# Patient Record
Sex: FEMALE | Race: BLACK OR AFRICAN AMERICAN | NOT HISPANIC OR LATINO | Employment: FULL TIME | URBAN - METROPOLITAN AREA
[De-identification: names, ages, dates, MRNs, and addresses within clinical notes are randomized per-mention and may not be internally consistent; named-entity substitution may affect disease eponyms.]

---

## 2019-04-30 ENCOUNTER — OFFICE VISIT (OUTPATIENT)
Dept: OBSTETRICS AND GYNECOLOGY | Facility: CLINIC | Age: 34
End: 2019-04-30
Payer: MEDICAID

## 2019-04-30 VITALS
BODY MASS INDEX: 33.26 KG/M2 | SYSTOLIC BLOOD PRESSURE: 118 MMHG | WEIGHT: 194.81 LBS | HEIGHT: 64 IN | DIASTOLIC BLOOD PRESSURE: 70 MMHG

## 2019-04-30 DIAGNOSIS — Z11.3 SCREENING EXAMINATION FOR STD (SEXUALLY TRANSMITTED DISEASE): ICD-10-CM

## 2019-04-30 DIAGNOSIS — N93.9 ABNORMAL UTERINE BLEEDING (AUB): Primary | ICD-10-CM

## 2019-04-30 PROCEDURE — 99203 OFFICE O/P NEW LOW 30 MIN: CPT | Mod: S$PBB,,, | Performed by: OBSTETRICS & GYNECOLOGY

## 2019-04-30 PROCEDURE — 99203 PR OFFICE/OUTPT VISIT, NEW, LEVL III, 30-44 MIN: ICD-10-PCS | Mod: S$PBB,,, | Performed by: OBSTETRICS & GYNECOLOGY

## 2019-04-30 PROCEDURE — 99203 OFFICE O/P NEW LOW 30 MIN: CPT | Mod: PBBFAC,PN | Performed by: OBSTETRICS & GYNECOLOGY

## 2019-04-30 PROCEDURE — 87510 GARDNER VAG DNA DIR PROBE: CPT

## 2019-04-30 PROCEDURE — 87480 CANDIDA DNA DIR PROBE: CPT

## 2019-04-30 PROCEDURE — 99999 PR PBB SHADOW E&M-NEW PATIENT-LVL III: ICD-10-PCS | Mod: PBBFAC,,, | Performed by: OBSTETRICS & GYNECOLOGY

## 2019-04-30 PROCEDURE — 99999 PR PBB SHADOW E&M-NEW PATIENT-LVL III: CPT | Mod: PBBFAC,,, | Performed by: OBSTETRICS & GYNECOLOGY

## 2019-04-30 PROCEDURE — 87491 CHLMYD TRACH DNA AMP PROBE: CPT

## 2019-04-30 RX ORDER — IBUPROFEN 800 MG/1
800 TABLET ORAL
COMMUNITY

## 2019-04-30 RX ORDER — VALACYCLOVIR HYDROCHLORIDE 500 MG/1
TABLET, FILM COATED ORAL
COMMUNITY

## 2019-04-30 NOTE — PROGRESS NOTES
Chief Complaint   Patient presents with    painful cycles       HISTORY OF PRESENT ILLNESS:   Cheri Burleson is a 33 y.o. female   (1  and 3 )who presents for AUB.  Patient's last menstrual period was 2019..  She has complains of irregular and  Heavy cycles with significant pain worsening over the past 6 years. She had a BTL after her last delivery and see feels like that is making it worse. Her cycles have gone from 5 days to 7 days and she is changing a super pad and tampon every 2 hours. She had an US with [facility] on Tuesday. Had annual 2018 with pap smear.      No past medical history on file.     No past surgical history on file.      Social History     Socioeconomic History    Marital status:      Spouse name: Not on file    Number of children: Not on file    Years of education: Not on file    Highest education level: Not on file   Occupational History    Not on file   Social Needs    Financial resource strain: Not on file    Food insecurity:     Worry: Not on file     Inability: Not on file    Transportation needs:     Medical: Not on file     Non-medical: Not on file   Tobacco Use    Smoking status: Not on file   Substance and Sexual Activity    Alcohol use: Not on file    Drug use: Not on file    Sexual activity: Not on file   Lifestyle    Physical activity:     Days per week: Not on file     Minutes per session: Not on file    Stress: Not on file   Relationships    Social connections:     Talks on phone: Not on file     Gets together: Not on file     Attends Caodaism service: Not on file     Active member of club or organization: Not on file     Attends meetings of clubs or organizations: Not on file     Relationship status: Not on file   Other Topics Concern    Not on file   Social History Narrative    Not on file       No family history on file.      OB History   No data available      for herpes outbreak     COMPREHENSIVE GYN HISTORY:  PAP  "History: had abnormal Pap with colpo 16 years ago but has been normal since.   Infection History: h/o herpes, no outbreaks in 2 years; h/o CT treated a few months ago, Denies PID.  Benign History:Denies uterine fibroids. Denies ovarian cysts. Denies endometriosis Denies other conditions.  Cancer History: Denies cervical cancer. Denies uterine cancer or hyperplasia. Denies ovarian cancer. Denies vulvar cancer or pre-cancer. Denies vaginal cancer or pre-cancer. Denies breast cancer. Denies colon cancer.  Cycle: 16/Q17d/7d, heavy with cramps  Contraception: bTL     ROS:  GENERAL: Denies weight gain or weight loss. Feeling well overall.   SKIN: Denies rash or lesions.   HEAD: Denies headache.   NODES: Denies enlarged lymph nodes.   CHEST: Denies shortness of breath.   ABDOMEN: No abdominal pain, constipation, diarrhea, nausea, vomiting or rectal bleeding.   URINARY: No frequency, dysuria, hematuria, or burning on urination.  REPRODUCTIVE: See HPI.   BREASTS: The patient denies pain, lumps, or nipple discharge.       Ht 5' 4" (1.626 m)   Wt 88.4 kg (194 lb 12.8 oz)   LMP 04/27/2019   BMI 33.44 kg/m²     APPEARANCE: Well nourished, well developed, in no acute distress.  NECK: Neck symmetric without  thyromegaly.  NODES: No inguinal, cervical lymph node enlargement.  CHEST: Lungs clear to auscultation.  HEART: Regular rate and rhythm, no murmurs, rubs or gallops.  ABDOMEN: Soft. No tenderness or masses. No hernias. No hepatosplenomegaly.    PELVIC:   VULVA: No lesions. Normal female genitalia.  URETHRAL MEATUS: Normal size and location, no lesions, no prolapse.  URETHRA: No masses, tenderness, prolapse or scarring.  VAGINA: Moist and well rugated, no discharge, no significant cystocele or rectocele.  CERVIX: No lesions and discharge. Nabothian cyst at 1 oclock   UTERUS: Normal size, regular shape, mobile, non-tender, bladder base nontender.  ADNEXA: No masses or tenderness.        1. Abnormal uterine bleeding (AUB)  "       Plan:  Will get TSH and CBC and STD screening. Will request pap smear records. Will request US records. Will see back in 2 weeks to go over results. Briefly discussed options of medications vs surgery. hesitant to consider hysterectomy.       F/u in 1 yr or PRN

## 2019-05-01 LAB
BACTERIAL VAGINOSIS DNA: POSITIVE
CANDIDA GLABRATA DNA: NEGATIVE
CANDIDA KRUSEI DNA: NEGATIVE
CANDIDA RRNA VAG QL PROBE: NEGATIVE
T VAGINALIS RRNA GENITAL QL PROBE: NEGATIVE

## 2019-05-02 ENCOUNTER — TELEPHONE (OUTPATIENT)
Dept: OBSTETRICS AND GYNECOLOGY | Facility: CLINIC | Age: 34
End: 2019-05-02

## 2019-05-02 LAB
C TRACH DNA SPEC QL NAA+PROBE: NOT DETECTED
N GONORRHOEA DNA SPEC QL NAA+PROBE: NOT DETECTED

## 2019-05-02 RX ORDER — METRONIDAZOLE 500 MG/1
500 TABLET ORAL EVERY 12 HOURS
Qty: 14 TABLET | Refills: 0 | Status: SHIPPED | OUTPATIENT
Start: 2019-05-02 | End: 2019-05-09

## 2019-05-02 NOTE — TELEPHONE ENCOUNTER
Please let patient know she tested + for a BV infection. This is not an STD but is a change in the normal bacterial denise in the vagina that can cause a discharge and an odor. I sent flagyl in to the pharmacy for her to take twice a day for 7 days. Please don't drink while taking the medication. It may give you a bad taste in your mouth or nausea, this is not an allergic reaction just a side effect of the medication. If it is intolerable we can call in a vaginal gel which can treat it but it can be more expensive depending on your insurance. I am still waiting on her other results. Just let us know.

## 2019-05-07 ENCOUNTER — TELEPHONE (OUTPATIENT)
Dept: OBSTETRICS AND GYNECOLOGY | Facility: CLINIC | Age: 34
End: 2019-05-07

## 2019-05-14 ENCOUNTER — OFFICE VISIT (OUTPATIENT)
Dept: OBSTETRICS AND GYNECOLOGY | Facility: CLINIC | Age: 34
End: 2019-05-14
Payer: MEDICAID

## 2019-05-14 VITALS
SYSTOLIC BLOOD PRESSURE: 118 MMHG | WEIGHT: 193.63 LBS | BODY MASS INDEX: 33.06 KG/M2 | HEIGHT: 64 IN | DIASTOLIC BLOOD PRESSURE: 64 MMHG

## 2019-05-14 DIAGNOSIS — N93.9 ABNORMAL UTERINE BLEEDING (AUB): Primary | ICD-10-CM

## 2019-05-14 PROCEDURE — 99999 PR PBB SHADOW E&M-EST. PATIENT-LVL III: ICD-10-PCS | Mod: PBBFAC,,, | Performed by: OBSTETRICS & GYNECOLOGY

## 2019-05-14 PROCEDURE — 99213 OFFICE O/P EST LOW 20 MIN: CPT | Mod: PBBFAC,PN | Performed by: OBSTETRICS & GYNECOLOGY

## 2019-05-14 PROCEDURE — 99212 PR OFFICE/OUTPT VISIT, EST, LEVL II, 10-19 MIN: ICD-10-PCS | Mod: S$PBB,,, | Performed by: OBSTETRICS & GYNECOLOGY

## 2019-05-14 PROCEDURE — 99212 OFFICE O/P EST SF 10 MIN: CPT | Mod: S$PBB,,, | Performed by: OBSTETRICS & GYNECOLOGY

## 2019-05-14 PROCEDURE — 99999 PR PBB SHADOW E&M-EST. PATIENT-LVL III: CPT | Mod: PBBFAC,,, | Performed by: OBSTETRICS & GYNECOLOGY

## 2019-05-14 RX ORDER — METRONIDAZOLE 500 MG/1
500 TABLET ORAL EVERY 12 HOURS
Qty: 14 TABLET | Refills: 0 | Status: SHIPPED | OUTPATIENT
Start: 2019-05-14 | End: 2019-05-21

## 2019-05-14 NOTE — PROGRESS NOTES
Chief Complaint   Patient presents with    Follow-up       HISTORY OF PRESENT ILLNESS:   Cheri Burleson is a 33 y.o. female   (1  and 3 )who presents for AUB.  Patient's last menstrual period was 2019..  She has complains of irregular and  Heavy cycles with significant pain worsening over the past 6 years. She had a BTL after her last delivery and see feels like that is making it worse. Her cycles have gone from 5 days to 7 days and she is changing a super pad and tampon every 2 hours. She had an US with [facility] on Tuesday. Had annual 2018 with pap smear.      History reviewed. No pertinent past medical history.       Past Surgical History:   Procedure Laterality Date     SECTION           Social History     Socioeconomic History    Marital status:      Spouse name: Not on file    Number of children: Not on file    Years of education: Not on file    Highest education level: Not on file   Occupational History    Not on file   Social Needs    Financial resource strain: Not on file    Food insecurity:     Worry: Not on file     Inability: Not on file    Transportation needs:     Medical: Not on file     Non-medical: Not on file   Tobacco Use    Smoking status: Current Every Day Smoker   Substance and Sexual Activity    Alcohol use: Yes     Comment: occasionally    Drug use: Never    Sexual activity: Yes     Partners: Male     Birth control/protection: None   Lifestyle    Physical activity:     Days per week: Not on file     Minutes per session: Not on file    Stress: Not on file   Relationships    Social connections:     Talks on phone: Not on file     Gets together: Not on file     Attends Confucianist service: Not on file     Active member of club or organization: Not on file     Attends meetings of clubs or organizations: Not on file     Relationship status: Not on file   Other Topics Concern    Not on file   Social History Narrative    Not on file  "      Family History   Problem Relation Age of Onset    Hypertension Maternal Grandmother     Glaucoma Maternal Grandmother     Aneurysm Mother     Glaucoma Mother          OB History    Para Term  AB Living   4 4       4   SAB TAB Ectopic Multiple Live Births                  # Outcome Date GA Lbr Sha/2nd Weight Sex Delivery Anes PTL Lv   4 Para      CS-Unspec      3 Para      Vag-Spont      2 Para      Vag-Spont      1 Para      Vag-Spont         for herpes outbreak     COMPREHENSIVE GYN HISTORY:  PAP History: had abnormal Pap with colpo 16 years ago but has been normal since.   Infection History: h/o herpes, no outbreaks in 2 years; h/o CT treated a few months ago, Denies PID.  Benign History:Denies uterine fibroids. Denies ovarian cysts. Denies endometriosis Denies other conditions.  Cancer History: Denies cervical cancer. Denies uterine cancer or hyperplasia. Denies ovarian cancer. Denies vulvar cancer or pre-cancer. Denies vaginal cancer or pre-cancer. Denies breast cancer. Denies colon cancer.  Cycle: 16/Q17d/7d, heavy with cramps  Contraception: bTL     ROS:  GENERAL: Denies weight gain or weight loss. Feeling well overall.   SKIN: Denies rash or lesions.   HEAD: Denies headache.   NODES: Denies enlarged lymph nodes.   CHEST: Denies shortness of breath.   ABDOMEN: No abdominal pain, constipation, diarrhea, nausea, vomiting or rectal bleeding.   URINARY: No frequency, dysuria, hematuria, or burning on urination.  REPRODUCTIVE: See HPI.   BREASTS: The patient denies pain, lumps, or nipple discharge.       /64   Ht 5' 4" (1.626 m)   Wt 87.8 kg (193 lb 9.6 oz)   LMP 2019   BMI 33.23 kg/m²     APPEARANCE: Well nourished, well developed, in no acute distress.  NECK: Neck symmetric without  thyromegaly.  NODES: No inguinal, cervical lymph node enlargement.  CHEST: Lungs clear to auscultation.  HEART: Regular rate and rhythm, no murmurs, rubs or gallops.  ABDOMEN: Soft. No " tenderness or masses. No hernias. No hepatosplenomegaly.    PELVIC:   VULVA: No lesions. Normal female genitalia.  URETHRAL MEATUS: Normal size and location, no lesions, no prolapse.  URETHRA: No masses, tenderness, prolapse or scarring.  VAGINA: Moist and well rugated, no discharge, no significant cystocele or rectocele.  CERVIX: No lesions and discharge. Nabothian cyst at 1 oclock   UTERUS: Normal size, regular shape, mobile, non-tender, bladder base nontender.  ADNEXA: No masses or tenderness.    [facility] US: uterus: 10.2x6x7.2 cm normal uterus with no masses, bilateral ovaries normal with 4mm follicle on right and 6mm follicle on left     No diagnosis found.    Plan:  Discussed options, no combined ocps since smokes, and she would like to do depo-provera again as she had tolerated that well in the past. Will make appt for depo-provera. If not controlled may consider EMB then ablation since doesn't want to miss work.       F/u in 1 yr or PRN

## 2019-05-15 ENCOUNTER — OFFICE VISIT (OUTPATIENT)
Dept: OBSTETRICS AND GYNECOLOGY | Facility: CLINIC | Age: 34
End: 2019-05-15
Payer: MEDICAID

## 2019-05-15 DIAGNOSIS — Z30.42 ENCOUNTER FOR SURVEILLANCE OF INJECTABLE CONTRACEPTIVE: Primary | ICD-10-CM

## 2019-05-15 PROCEDURE — 99999 PR PBB SHADOW E&M-EST. PATIENT-LVL I: ICD-10-PCS | Mod: PBBFAC,,, | Performed by: OBSTETRICS & GYNECOLOGY

## 2019-05-15 PROCEDURE — 99499 UNLISTED E&M SERVICE: CPT | Mod: S$PBB,,, | Performed by: OBSTETRICS & GYNECOLOGY

## 2019-05-15 PROCEDURE — 99999 PR PBB SHADOW E&M-EST. PATIENT-LVL I: CPT | Mod: PBBFAC,,, | Performed by: OBSTETRICS & GYNECOLOGY

## 2019-05-15 PROCEDURE — 99211 OFF/OP EST MAY X REQ PHY/QHP: CPT | Mod: PBBFAC,PN,25 | Performed by: OBSTETRICS & GYNECOLOGY

## 2019-05-15 PROCEDURE — 96372 THER/PROPH/DIAG INJ SC/IM: CPT | Mod: PBBFAC,PN

## 2019-05-15 PROCEDURE — 99499 NO LOS: ICD-10-PCS | Mod: S$PBB,,, | Performed by: OBSTETRICS & GYNECOLOGY

## 2019-05-15 RX ORDER — MEDROXYPROGESTERONE ACETATE 150 MG/ML
150 INJECTION, SUSPENSION INTRAMUSCULAR
Status: COMPLETED | OUTPATIENT
Start: 2019-05-15 | End: 2019-05-15

## 2019-05-15 RX ADMIN — MEDROXYPROGESTERONE ACETATE 150 MG: 150 INJECTION, SUSPENSION INTRAMUSCULAR at 01:05

## 2019-05-15 NOTE — PROGRESS NOTES
Administered depo Provera 150mg/ml @ 0930 on 5/15/19  1ml IM inj in RUQG  Patient tolerated well.  Patient instructed to return on 8/6/19 for next injection  Patient verbalized understanding  Lot: [id number]  Exp:  5/20  Reviewed 2 identifiers and allergies prior to injection.    Date of last pap: n/a  Last Depo-Provera: n/a  UPT indicated: yes, negative  Ordering provider: Dr. Coffey

## 2019-07-02 ENCOUNTER — OUTSIDE PLACE OF SERVICE (OUTPATIENT)
Dept: CARDIOLOGY | Facility: CLINIC | Age: 34
End: 2019-07-02
Payer: MEDICAID

## 2019-07-02 PROCEDURE — 93010 PR ELECTROCARDIOGRAM REPORT: ICD-10-PCS | Mod: ,,, | Performed by: STUDENT IN AN ORGANIZED HEALTH CARE EDUCATION/TRAINING PROGRAM

## 2019-07-02 PROCEDURE — 93010 ELECTROCARDIOGRAM REPORT: CPT | Mod: ,,, | Performed by: STUDENT IN AN ORGANIZED HEALTH CARE EDUCATION/TRAINING PROGRAM

## 2019-08-06 ENCOUNTER — CLINICAL SUPPORT (OUTPATIENT)
Dept: OBSTETRICS AND GYNECOLOGY | Facility: CLINIC | Age: 34
End: 2019-08-06
Payer: MEDICAID

## 2019-08-06 DIAGNOSIS — Z30.9 ENCOUNTER FOR CONTRACEPTIVE MANAGEMENT, UNSPECIFIED TYPE: Primary | ICD-10-CM

## 2019-08-06 RX ORDER — MEDROXYPROGESTERONE ACETATE 150 MG/ML
150 INJECTION, SUSPENSION INTRAMUSCULAR
Status: SHIPPED | OUTPATIENT
Start: 2019-08-06

## 2019-08-06 NOTE — PROGRESS NOTES
8/6/2019 @ 10:00AM: Patient verified name and date of birth.  Injection questionnaire reviewed with patient prior to administration.  No contraindications noted.  Patient instructed to wait 15 minutes prior to leaving office to monitor for any adverse reactions.  Patient verbalized understanding.  Medroxyprogesterone 150mg/1 ml administered to left gluteal area per 's recommendation.  Notified that next injection is due on October 28, 2019. Patient tolerated well. No signs of adverse reactions noted.      : TEVA  LOT #: [id number]  EXP DATE: 10/20      UPT indicated: No   Ordering provider: Dr. Coffey

## 2019-10-29 ENCOUNTER — CLINICAL SUPPORT (OUTPATIENT)
Dept: OBSTETRICS AND GYNECOLOGY | Facility: CLINIC | Age: 34
End: 2019-10-29
Payer: MEDICAID

## 2019-10-29 DIAGNOSIS — Z30.9 ENCOUNTER FOR CONTRACEPTIVE MANAGEMENT, UNSPECIFIED TYPE: Primary | ICD-10-CM

## 2019-10-29 RX ORDER — MEDROXYPROGESTERONE ACETATE 150 MG/ML
150 INJECTION, SUSPENSION INTRAMUSCULAR
Status: COMPLETED | OUTPATIENT
Start: 2019-10-29 | End: 2020-10-20

## 2019-10-29 NOTE — PROGRESS NOTES
10/29/19 @ 9:30AM: Patient verified name and date of birth.  Injection questionnaire reviewed with patient prior to administration.  No contraindications noted.  Patient instructed to wait 15 minutes prior to leaving office to monitor for any adverse reactions.  Patient verbalized understanding.  Medroxyprogesterone 150mg/1 ml administered to left gluteal area per 's recommendation.  Notified that next injection is due on January 20, 2020.  Depo calendar given to patient.  Next appointment scheduled for 1/21/2020.   Patient tolerated well. No signs of adverse reactions noted.      : TEVA   LOT #: [id number]  EXP DATE: 12/20      Last Depo-Provera: 8/6/2019  UPT indicated: No   Ordering provider: Shelbi Coffey

## 2020-01-21 ENCOUNTER — CLINICAL SUPPORT (OUTPATIENT)
Dept: OBSTETRICS AND GYNECOLOGY | Facility: CLINIC | Age: 35
End: 2020-01-21
Payer: MEDICAID

## 2020-01-21 ENCOUNTER — OFFICE VISIT (OUTPATIENT)
Dept: OBSTETRICS AND GYNECOLOGY | Facility: CLINIC | Age: 35
End: 2020-01-21
Payer: MEDICAID

## 2020-01-21 VITALS
SYSTOLIC BLOOD PRESSURE: 126 MMHG | DIASTOLIC BLOOD PRESSURE: 80 MMHG | BODY MASS INDEX: 33.12 KG/M2 | WEIGHT: 194 LBS | HEIGHT: 64 IN

## 2020-01-21 DIAGNOSIS — Z30.9 ENCOUNTER FOR CONTRACEPTIVE MANAGEMENT, UNSPECIFIED TYPE: Primary | ICD-10-CM

## 2020-01-21 DIAGNOSIS — Z01.419 ENCOUNTER FOR ANNUAL ROUTINE GYNECOLOGICAL EXAMINATION: Primary | ICD-10-CM

## 2020-01-21 DIAGNOSIS — Z12.4 PAP SMEAR FOR CERVICAL CANCER SCREENING: ICD-10-CM

## 2020-01-21 DIAGNOSIS — Z11.3 SCREENING EXAMINATION FOR STD (SEXUALLY TRANSMITTED DISEASE): ICD-10-CM

## 2020-01-21 LAB
B-HCG UR QL: NEGATIVE
CTP QC/QA: YES

## 2020-01-21 PROCEDURE — 81025 URINE PREGNANCY TEST: CPT | Mod: PBBFAC,PN

## 2020-01-21 PROCEDURE — 87624 HPV HI-RISK TYP POOLED RSLT: CPT

## 2020-01-21 PROCEDURE — 99999 PR PBB SHADOW E&M-EST. PATIENT-LVL III: CPT | Mod: PBBFAC,,, | Performed by: OBSTETRICS & GYNECOLOGY

## 2020-01-21 PROCEDURE — 99395 PR PREVENTIVE VISIT,EST,18-39: ICD-10-PCS | Mod: S$PBB,,, | Performed by: OBSTETRICS & GYNECOLOGY

## 2020-01-21 PROCEDURE — 99999 PR PBB SHADOW E&M-EST. PATIENT-LVL III: ICD-10-PCS | Mod: PBBFAC,,, | Performed by: OBSTETRICS & GYNECOLOGY

## 2020-01-21 PROCEDURE — 88175 CYTOPATH C/V AUTO FLUID REDO: CPT

## 2020-01-21 PROCEDURE — 87491 CHLMYD TRACH DNA AMP PROBE: CPT

## 2020-01-21 PROCEDURE — 99213 OFFICE O/P EST LOW 20 MIN: CPT | Mod: PBBFAC,PN | Performed by: OBSTETRICS & GYNECOLOGY

## 2020-01-21 PROCEDURE — 99395 PREV VISIT EST AGE 18-39: CPT | Mod: S$PBB,,, | Performed by: OBSTETRICS & GYNECOLOGY

## 2020-01-21 RX ORDER — CITALOPRAM 20 MG/1
TABLET, FILM COATED ORAL
COMMUNITY

## 2020-01-21 RX ORDER — AMOXICILLIN 500 MG/1
CAPSULE ORAL
COMMUNITY

## 2020-01-21 RX ORDER — MEDROXYPROGESTERONE ACETATE 150 MG/ML
150 INJECTION, SUSPENSION INTRAMUSCULAR
Status: COMPLETED | OUTPATIENT
Start: 2020-01-21 | End: 2020-04-28

## 2020-01-21 RX ORDER — ESCITALOPRAM OXALATE 20 MG/1
TABLET ORAL
COMMUNITY

## 2020-01-21 RX ORDER — IBUPROFEN 800 MG/1
TABLET ORAL
COMMUNITY

## 2020-01-21 RX ORDER — CYCLOBENZAPRINE HCL 10 MG
TABLET ORAL
COMMUNITY

## 2020-01-21 RX ORDER — HYDROCODONE BITARTRATE AND ACETAMINOPHEN 7.5; 325 MG/1; MG/1
TABLET ORAL
COMMUNITY

## 2020-01-21 RX ORDER — CIPROFLOXACIN 500 MG/1
TABLET ORAL
COMMUNITY

## 2020-01-21 RX ORDER — VALACYCLOVIR HYDROCHLORIDE 500 MG/1
TABLET, FILM COATED ORAL
COMMUNITY

## 2020-01-21 RX ORDER — IBUPROFEN 600 MG/1
TABLET ORAL
COMMUNITY

## 2020-01-21 RX ORDER — MELOXICAM 15 MG/1
TABLET ORAL
COMMUNITY

## 2020-01-21 NOTE — PROGRESS NOTES
1/21/2020 @ 1400pm: Patient verified name and date of birth.  Injection questionnaire reviewed with patient prior to administration.  No contraindications noted.  Patient instructed to wait 15 minutes prior to leaving office to monitor for any adverse reactions.  Patient verbalized understanding.  Medroxyprogesterone 150mg/1 ml administered to right gluteal area per 's recommendation.  Patient tolerated well with no adverse reactions noted. Notified that next injection is due on April 14, 2020 and given depo calendar.      LOT #: [id number]  EXP DATE: Oct 2020    Date of last visit: 1/21/2020  UPT indicated: Yes (Result: No)   Ordering provider: Erlinda

## 2020-01-21 NOTE — PROGRESS NOTES
CC: annual       HISTORY OF PRESENT ILLNESS:   Cheri Burleson is a 34 y.o. female   (1  and 3 )who presents for annual exam.  She has a h/o AUB and has been on depo-provera for a year for it. She reports only gets a cycle every few months and has no pain. She would like to continue it. Wants STD testing.      No past medical history on file. KENNETH  Past Surgical History:   Procedure Laterality Date     SECTION     AREAN SECTION          Socioeconomic History    Marital status:      Spouse name: Not on file    Number of children: Not on file    Years of education: Not on file    Highest education level: Not on file   Occupational History    Not on file   Social Needs    Financial resource strain: Not on file    Food insecurity:     Worry: Not on file     Inability: Not on file    Transportation needs:     Medical: Not on file     Non-medical: Not on file   Tobacco Use    Smoking status: Current Every Day Smoker   Substance and Sexual Activity    Alcohol use: Yes     Comment: occasionally    Drug use: Never    Sexual activity: Yes     Partners: Male     Birth control/protection: None   Lifestyle    Physical activity:     Days per week: Not on file     Minutes per session: Not on file    Stress: Not on file   Relationships    Social connections:     Talks on phone: Not on file     Gets together: Not on file     Attends Cheondoism service: Not on file     Active member of club or organization: Not on file     Attends meetings of clubs or organizations: Not on file     Relationship status: Not on file   Other Topics Concern    Not on file   Social History Narrative    Not on file       Family History   Problem Relation Age of Onset    Hypertension Maternal Grandmother     Glaucoma Maternal Grandmother     Aneurysm Mother     Glaucoma Mother          OB History    Para Term  AB Living   4 4       4   SAB TAB Ectopic Multiple Live Births                  #  Outcome Date GA Lbr Sha/2nd Weight Sex Delivery Anes PTL Lv   4 Para      CS-Unspec      3 Para      Vag-Spont      2 Para      Vag-Spont      1 Para      Vag-Spont         for herpes outbreak     COMPREHENSIVE GYN HISTORY:  PAP History: had abnormal Pap with colpo 16 years ago but has been normal since; reports was normal 2018   Infection History: h/o herpes, no outbreaks in 2 years; h/o CT treated a few months ago, Denies PID.  Benign History:Denies uterine fibroids. Denies ovarian cysts. Denies endometriosis Denies other conditions.  Cancer History: Denies cervical cancer. Denies uterine cancer or hyperplasia. Denies ovarian cancer. Denies vulvar cancer or pre-cancer. Denies vaginal cancer or pre-cancer. Denies breast cancer. Denies colon cancer.  Cycle: 16/Q17d/7d, heavy with cramps  Contraception: bTL     ROS:  GENERAL: Denies weight gain or weight loss. Feeling well overall.   SKIN: Denies rash or lesions.   HEAD: Denies headache.   NODES: Denies enlarged lymph nodes.   CHEST: Denies shortness of breath.   ABDOMEN: No abdominal pain, constipation, diarrhea, nausea, vomiting or rectal bleeding.   URINARY: No frequency, dysuria, hematuria, or burning on urination.  REPRODUCTIVE: See HPI.   BREASTS: The patient denies pain, lumps, or nipple discharge.       There were no vitals taken for this visit.    APPEARANCE: Well nourished, well developed, in no acute distress.  NECK: Neck symmetric without  thyromegaly.  NODES: No inguinal, cervical lymph node enlargement.  CHEST: Lungs clear to auscultation.  HEART: Regular rate and rhythm, no murmurs, rubs or gallops.  ABDOMEN: Soft. No tenderness or masses. No hernias. No hepatosplenomegaly.    PELVIC:   VULVA: No lesions. Normal female genitalia.  URETHRAL MEATUS: Normal size and location, no lesions, no prolapse.  URETHRA: No masses, tenderness, prolapse or scarring.  VAGINA: Moist and well rugated, no discharge, no significant cystocele or  rectocele.  CERVIX: No lesions and discharge. Nabothian cyst at 1 oclock   UTERUS: Normal size, regular shape, mobile, non-tender, bladder base nontender.  ADNEXA: No masses or tenderness.    [facility] US: uterus: 10.2x6x7.2 cm normal uterus with no masses, bilateral ovaries normal with 4mm follicle on right and 6mm follicle on left     Annual exam  STD testing     Plan:  1.  Pap smear with HPV ordered and gc/ct done  2. Depo provera working well so will continue. Should take calcium and vitamin D while on depo.       F/u in 1 yr or PRN

## 2020-01-23 ENCOUNTER — TELEPHONE (OUTPATIENT)
Dept: OBSTETRICS AND GYNECOLOGY | Facility: CLINIC | Age: 35
End: 2020-01-23

## 2020-01-23 LAB
C TRACH DNA SPEC QL NAA+PROBE: NOT DETECTED
N GONORRHOEA DNA SPEC QL NAA+PROBE: NOT DETECTED

## 2020-01-23 NOTE — TELEPHONE ENCOUNTER
----- Message from Mari Fleming sent at 1/23/2020  2:37 PM CST -----  Contact: [phone] self   Pt is returning your call. Please advise

## 2020-01-23 NOTE — TELEPHONE ENCOUNTER
----- Message from Shelbi Coffey MD sent at 1/23/2020  8:45 AM CST -----  Please let her know that her gc/ct was negative. We are still waiting on her pap smear

## 2020-01-24 LAB
HPV HR 12 DNA SPEC QL NAA+PROBE: NEGATIVE
HPV16 AG SPEC QL: NEGATIVE
HPV18 DNA SPEC QL NAA+PROBE: NEGATIVE

## 2020-02-11 LAB
FINAL PATHOLOGIC DIAGNOSIS: NORMAL
Lab: NORMAL

## 2020-02-12 NOTE — PROGRESS NOTES
Please send patient pap smear letter or call. Her pap was negative and negative for the HPV virus so since it has been over 10 years since she had an abnormal pap smear the new recommendations are that she will not need another one for 5 years but we will still continue to see her for annuals. thanks.

## 2020-04-28 ENCOUNTER — CLINICAL SUPPORT (OUTPATIENT)
Dept: OBSTETRICS AND GYNECOLOGY | Facility: CLINIC | Age: 35
End: 2020-04-28
Payer: MEDICAID

## 2020-04-28 DIAGNOSIS — Z30.9 ENCOUNTER FOR CONTRACEPTIVE MANAGEMENT, UNSPECIFIED TYPE: Primary | ICD-10-CM

## 2020-04-28 LAB
B-HCG UR QL: NEGATIVE
CTP QC/QA: YES

## 2020-04-28 PROCEDURE — 81025 URINE PREGNANCY TEST: CPT | Mod: PBBFAC,PO

## 2020-04-28 PROCEDURE — 96372 THER/PROPH/DIAG INJ SC/IM: CPT | Mod: PBBFAC,PO

## 2020-04-28 RX ADMIN — MEDROXYPROGESTERONE ACETATE 150 MG: 150 INJECTION, SUSPENSION INTRAMUSCULAR at 02:04

## 2020-04-28 NOTE — PROGRESS NOTES
Pt received 150mg IM Depo Provera to the RUOQ on 4/28/20. Pt tolerated well. Pt instructed to get next injection on 7/20/20. Pt verbalized understanding. Lot # [id number]   Exp date 10/21 UPT neg

## 2020-07-27 ENCOUNTER — CLINICAL SUPPORT (OUTPATIENT)
Dept: OBSTETRICS AND GYNECOLOGY | Facility: CLINIC | Age: 35
End: 2020-07-27
Payer: MEDICAID

## 2020-07-27 DIAGNOSIS — Z30.9 ENCOUNTER FOR CONTRACEPTIVE MANAGEMENT, UNSPECIFIED TYPE: Primary | ICD-10-CM

## 2020-07-27 PROCEDURE — 96372 THER/PROPH/DIAG INJ SC/IM: CPT | Mod: PBBFAC,PN

## 2020-07-27 RX ORDER — MEDROXYPROGESTERONE ACETATE 150 MG/ML
150 INJECTION, SUSPENSION INTRAMUSCULAR
Status: COMPLETED | OUTPATIENT
Start: 2020-07-27 | End: 2020-07-27

## 2020-07-27 RX ADMIN — MEDROXYPROGESTERONE ACETATE 150 MG: 150 INJECTION, SUSPENSION INTRAMUSCULAR at 01:07

## 2020-07-27 NOTE — PROGRESS NOTES
7/27/2020 @ 1:31pm: Patient verified name and date of birth.  Injection questionnaire reviewed with patient prior to administration.  No contraindications noted.  Patient instructed to wait 15 minutes prior to leaving office to monitor for any adverse reactions.  Patient verbalized understanding.  Medroxyprogesterone 150mg/1 ml administered to LUOQ of gluteal area per 's recommendation.  Patient tolerated well with no adverse reactions noted. Notified that next injection is due on October 18, 2020 and given depo calendar.      LOT #: [id number]  EXP DATE: 5/2022    UPT indicated: N/A  Ordering provider: Ministerio Pedraza

## 2020-10-20 ENCOUNTER — CLINICAL SUPPORT (OUTPATIENT)
Dept: OBSTETRICS AND GYNECOLOGY | Facility: CLINIC | Age: 35
End: 2020-10-20
Payer: MEDICAID

## 2020-10-20 PROCEDURE — 96372 THER/PROPH/DIAG INJ SC/IM: CPT | Mod: PBBFAC,PO

## 2020-10-20 RX ADMIN — MEDROXYPROGESTERONE ACETATE 150 MG: 150 INJECTION, SUSPENSION INTRAMUSCULAR at 02:10

## 2020-10-20 NOTE — PROGRESS NOTES
Pt received 150mg IM Depo Provera to the RUOQ on 10/20/20. Pt tolerated well. Pt instructed to get next injection on 1/11/21. Pt verbalized understanding. Lot # [id number]   Exp date 2/22